# Patient Record
Sex: FEMALE | Race: WHITE
[De-identification: names, ages, dates, MRNs, and addresses within clinical notes are randomized per-mention and may not be internally consistent; named-entity substitution may affect disease eponyms.]

---

## 2018-09-03 ENCOUNTER — HOSPITAL ENCOUNTER (EMERGENCY)
Dept: HOSPITAL 25 - UCEAST | Age: 68
Discharge: HOME | End: 2018-09-03
Payer: MEDICARE

## 2018-09-03 VITALS — DIASTOLIC BLOOD PRESSURE: 78 MMHG | SYSTOLIC BLOOD PRESSURE: 140 MMHG

## 2018-09-03 DIAGNOSIS — Z88.2: ICD-10-CM

## 2018-09-03 DIAGNOSIS — H92.01: ICD-10-CM

## 2018-09-03 DIAGNOSIS — I10: ICD-10-CM

## 2018-09-03 DIAGNOSIS — Z88.1: ICD-10-CM

## 2018-09-03 DIAGNOSIS — H60.91: Primary | ICD-10-CM

## 2018-09-03 DIAGNOSIS — Z87.891: ICD-10-CM

## 2018-09-03 PROCEDURE — 99212 OFFICE O/P EST SF 10 MIN: CPT

## 2018-09-03 PROCEDURE — G0463 HOSPITAL OUTPT CLINIC VISIT: HCPCS

## 2018-09-03 NOTE — UC
Ear Complaint HPI





- HPI Summary


HPI Summary: 





67 y/o female presents to the urgent care c/o


c/o right ear pain for a few weeks, worse since last night.





- History of Current Complaint


Chief Complaint: UCEar


Stated Complaint: R EAR PAIN


Time Seen by Provider: 18 13:52


Hx Obtained From: Patient


Pain Intensity: 6





- Allergies/Home Medications


Allergies/Adverse Reactions: 


 Allergies











Allergy/AdvReac Type Severity Reaction Status Date / Time


 


azithromycin Allergy  Hives Verified 18 12:57


 


erythromycin base Allergy  GI Upset Verified 18 12:57


 


gatifloxacin [From Tequin] Allergy  Hives Verified 18 12:57


 


Sulfa (Sulfonamide Allergy  Hives Verified 18 12:57





Antibiotics)     














PMH/Surg Hx/FS Hx/Imm Hx





- Surgical History


Surgical History: Yes


Surgery Procedure, Year, and Place: tonsilLECTOMY.  tubal LIGATION.   

.  fatty tumor removed from UPPER abdomen BY RIB CAGE.  COLONOSCOPY





- Family History


Known Family History: Positive: Other - alcoholism/ subtance abuse





- Social History


Alcohol Use: None


Substance Use Type: None


Smoking Status (MU): Former Smoker


Type: Cigarettes


Amount Used/How Often: A FEW-2 PPD PPD X 24 YEARS


When Did the Patient Quit Smoking/Using Tobacco: 30 YEARS AGO





Physical Exam


Vital Signs: 


 Initial Vital Signs











Temp  98.8 F   18 12:50


 


Pulse  71   18 12:50


 


Resp  18   18 12:50


 


BP  140/78   18 12:50


 


Pulse Ox  95   18 12:50














Ear Complaint Course/Dx





- Differential Dx/Diagnosis


Differential Diagnosis/HQI/PQRI: Cerumen Impaction, Otitis Externa, Otitis Media

, Perforated TM, URI


Provider Diagnoses: 1- Rt otitis externa.  2- Otalgia.  3- Uncontrolled HTN





Discharge





- Discharge Plan


Condition: Stable


Disposition: HOME


Prescriptions: 


Neomyc/Polym/HC 1% OTIC SUSP* [Cortisporin Otic Susp 1%*] 4 drop RIGHT EAR QID #

1 btl


Patient Education Materials:  Otitis Externa (DC), Low-Sodium Diet (ED)


Referrals: 


Amilcar Pillai MD [Primary Care Provider] - 3 Days


Additional Instructions: 


1-Please apply otic antibiotic on your Rt ear as directed. 


2-continue taking Hydrocodone or Tylenol PO q6-8hrs  for pain. 


3-If symptoms do not improve or worsen please f/u with your PCP in 3 days  or 

return to the urgent care for further evaluation and treatment.


4-Your BP is elevated today. please decrease salt in your diet, monitor BP and 

if it continues to be elevated please f/u with your PCP for further management

















- Billing Disposition and Condition


Condition: STABLE


Disposition: Home

## 2018-09-03 NOTE — XMS REPORT
Stephanie Lindsey

 Created on:2018



Patient:Stephanie Lindsey

Sex:Female

:1950

External Reference #:[id number]





Demographics







 Address  [address] [id number]

 

 Home Phone  [phone]

 

 Email Address  [email or web address]

 

 Preferred Language  English

 

 Marital Status  Not  Or 

 

 Quaker Affiliation  Unknown

 

 Race  White

 

 Ethnic Group  Not  Or 









Author







 Organization  [employer]

 

 Address  [address]



   [place] [address]

 

 Phone  [phone]









Support







 Name  Relationship  Address  Phone

 

 Da Lindsey  Unavailable  Unavailable  [phone]









Care Team Providers







 Name  Role  Phone

 

 Luz Maria Trinh MD  Care Team Information   Unavailable

 

 Amilcar Pillai MD  Primary Care Physician  Unavailable









Payers







 Type  Date  Identification Numbers  Payment Provider  Subscriber

 

 Medicare Primary    Policy Number: [id number]  Medicare  Stephanie Lindsey









 PayID: [id number]  [address]









 [place] [address]









 [facility]    Policy Number: [id number]  [facility]  Stephanie Lindsey









 PayID: [id number]  [address]









 [place] [address]







Problems







 Date  Description  Provider  Status

 

 Onset: 2015  Trigeminal neuralgia  Felicia Tadeo M.D.  Active









   Note: right:  Open surgery Dr. Singh 4/13/15







Family History







 Date  Family Member(s)  Problem(s)  Comments

 

   General  Cancer  

 

   General  Sons with raynaud's  

 

   General  Brain Cancer  

 

   General  Cervical Cancer  

 

   Father  Esophagus Cancer  







Social History







 Type  Date  Description  Comments

 

 ETOH Use    Denies alcohol use  

 

 Smoking    Patient is a former smoker  

 

 Exercise Type/Frequency    Exercises regularly  







Allergies, Adverse Reactions, Alerts







 Date  Description  Reaction  Status  Severity  Comments

 

 10/31/2012  Sulfa  hives, disorientation  active    

 

 10/31/2012  Erythromycin  GI disturbance  active    

 

 10/31/2012  Ibuprofen    active  Severe  Digestive disturbance

 

 10/31/2012  Zithromax  rash  active    

 

 2015  TeQuin    active    







Medications







 Medication  Date  Status  Form  Strength  Qnty  SIG  Indications  Ordering



                 Provider

 

 Citalopram  /  Active  Tablets  40mg  30tabs  1 po qd    Unknown



 Hydrobromide  0000              

 

 Metoclopramide  /  Active  Tablets  5mg  120tab  2 tabs po    Unknown



 HCL  0000        s  q evening    

 

 Multivitamins  00/  Active  Capsules    30caps  1 capsule    Unknown



   0000          macho;y    

 

 Vitamin D 3  /  Active    1,000mg    1 po daily    Unknown



   0000              

 

 Omega 3  /  Active        1 po daily    Unknown



   0000              

 

 Atorvastatin  /  Active  Tablets  20mg    take 1    Unknown



 Calcium  0000          tablet at    



             bedtime    

 

 Melatonin  /  Active  Capsules  3mg    1 tab by    Unknown



   0000          mouth    



             every    



             night at    



             bedtime    

 

 Aspirin  /  Active  Tablets  325mg    1 by mouth    Unknown



   0000          every day    

 

 Hydrocodone-Acet  /  Active  Tablets  5-325mg    1-2 tabs    Unknown



 aminophen  0000          by mouth    



             every 4- 6    



             hours as    



             needed    



             pain    

 

 Nadolol  /  Active  Tablets  20mg    Take 1    Unknown



   0000          Tablet By    



             Mouth    



             Every Day    

 

 Citalopram  /  Active  Tablets  40mg    Take 1    Unknown



 Hydrobromide  0000          Tablet By    



             Mouth    



             Every Day    

 

 Famotidine  /  Active  Tablets  40mg    1 by mouth    Unknown



   0000          every day    

 

                 

 

 Prednisone  /  Hx  Tablets  20mg  25tabs  60mg PO    Mounika



    -          qam x 3    Gnadt, NP



   /          days, then    



             40mg PO    



             qam x 3    



             days, then    



             20mg PO    



             qam x 3    



             days, then    



             10mg PO    



             qam x 3    



             days, then    



             stop.    

 

 Tegretol-XR  /  Hx  Tablets ER  200mg  120tab  take 2 by    Felicia



    -    HR    s  mouth    Shwetha,



   /          twice a    M.D.



             day    

 

 Tegretol-XR  06/10/  Hx  Tablets ER  100mg  240tab  take 4 po    Felicia



    -    HR    s  bid    Shwetha,



   /              M.D.



                 

 

 Topiramate  /  Hx  Tablets  100mg  60tabs  1 by mouth    Mounika



   0000 -          twice a    Gnadt, NP



             day    



                 

 

 Topiramate  /  Hx  Tablets  25mg  60tabs  tab by    Felicia



   0000 -          mouth    Shwetha,



   /          twice a    M.D.



             day    

 

 Mysoline  /  Hx  Tablets  50mg  540tab  1/2 to 1    Felicia



   0000 -        s  tab qpm    Shwetha,



   /              M.D.



                 

 

 Metoprolol  /  Hx  Tablets  50mg  60tabs  1 po qd    Unknown



 Tartrate  0000 -              



   2017              

 

 Simvastatin  00/00/  Hx  Tablets  40mg  90tabs  1 po qhs    Unknown



   0000 -              



   2015              

 

 Hydrocodone/Acet  /00/  Hx  Tablets  5-325mg  30tabs  1 to 2    Unknown



 aminophen   -          tabs po    



   /          qhs prn    



                 

 

 Benadryl Allergy  /00/  Hx  Capsules  25mg  30caps  1 po qhs    Unknown



    -              



   2015              

 

 Nexium  /00/  Hx  Capsules  40mg  30caps  1 po qd    Unknown



   0000 -    DR          



   2017              

 

 Co Q10  00/  Hx  Capsules  200mg  30caps  1 po qd    Unknown



    -              



   2014              

 

 Aspirin  /00/  Hx  Tablets  325mg    1 po qd    Unknown



    -              



   2015              

 

 Topiramate  00/  Hx  Tablets  25mg  60tabs  1 by mouth    Mounika



   0000 -          twice a    Gnadt, NP



   05/16/          day in    



             addition    



             to 100mg    



             tabs    

 

 Keflex  /00/  Hx  Capsules      2 days    Unknown



   0000 -          left    



   2017              

 

 Propranolol HCL  00/  Hx  Tablets  20mg    1 by mouth    Unknown



   0000 -          twice a    



   08/17/          day    



   2018              

 

 Omeprazole  00/  Hx  Capsules  20mg    1 by mouth    Unknown



   0000 -    DR      every day    



   2018              







Vital Signs







 Date  Vital  Result  Comment

 

 2018  Height  62 inches  5'2"









 Weight  188.50 lb  

 

 Heart Rate  57 /min  

 

 BP Systolic Sitting  110 mmHg  

 

 BP Diastolic Sitting  68 mmHg  

 

 Pain Level  8  

 

 O2 % BldC Oximetry  96 %  

 

 BMI (Body Mass Index)  34.5 kg/m2  









 2017  Height  62 inches  5'2"









 Weight  197.00 lb  

 

 Heart Rate  80 /min  

 

 BP Systolic Sitting  110 mmHg  

 

 BP Diastolic Sitting  70 mmHg  

 

 Respiratory Rate  14 /min  

 

 Pain Level  7  

 

 BMI (Body Mass Index)  36.0 kg/m2  









 2017  Height  62 inches  5'2"









 Weight  201.00 lb  

 

 Heart Rate  78 /min  

 

 BP Systolic Sitting  110 mmHg  

 

 BP Diastolic Sitting  64 mmHg  

 

 Respiratory Rate  14 /min  

 

 Pain Level  6  

 

 BMI (Body Mass Index)  36.8 kg/m2  









 2015  Height  62 inches  5'2"









 Weight  175.00 lb  

 

 Heart Rate  72 /min  

 

 BP Systolic Sitting  114 mmHg  

 

 BP Diastolic Sitting  70 mmHg  

 

 Respiratory Rate  16 /min  

 

 BMI (Body Mass Index)  32.0 kg/m2  









 10/20/2014  Height  62 inches  5'2"









 Weight  191.50 lb  

 

 Heart Rate  72 /min  

 

 BP Systolic Sitting  110 mmHg  

 

 BP Diastolic Sitting  76 mmHg  

 

 Respiratory Rate  16 /min  

 

 BMI (Body Mass Index)  35.0 kg/m2  









 2014  Height  62 inches  5'2"









 Weight  192.12 lb  

 

 Heart Rate  70 /min  

 

 BP Systolic Sitting  102 mmHg  

 

 BP Diastolic Sitting  70 mmHg  

 

 Respiratory Rate  16 /min  

 

 BMI (Body Mass Index)  35.1 kg/m2  









 2014  Heart Rate  60 /min  









 BP Systolic Sitting  106 mmHg  

 

 BP Diastolic Sitting  70 mmHg  

 

 Respiratory Rate  16 /min  









 2013  Heart Rate  60 /min  









 BP Systolic Sitting  114 mmHg  

 

 BP Diastolic Sitting  68 mmHg  

 

 Respiratory Rate  12 /min  









 10/31/2012  Weight  185.00 lb  









 Heart Rate  72 /min  

 

 BP Systolic  130 mmHg  

 

 BP Diastolic  78 mmHg  

 

 Respiratory Rate  14 /min  







Results







 Test  Date  Test  Result  H/L  Range  Note

 

 Laboratory test finding  2017  Uric Acid  6.5 mg/dL    2.3-6.6  1

 

 Anca AB Ser If  2017  C-Anca  Negative    Negative  









 P-Anca  Negative    Negative  2









 Laboratory test finding  2017  Creatine Kinase(CK)  51 U/L      3

 

 Laboratory test finding  2017  Erythrocyte Sed Rate  13 mm/Hr    0-40  4









 C Reactive Protein  5.57 mg/L  High  < 5.00  5









 Connective Tissue Panel  2017  Anti-Nuclear Antibody  0.3 U      6









 Cyclic Citrullinated Peptide  <15.6 U      7

 

 Interpretation  See Comment      8









 Laboratory test finding  2017  Rheumatoid Factor  <15 IU/mL    <15  9

 

 Hla B27  2017  Hla B27  Negative      10









 Hla B27 Interp  See Comment      11









 Laboratory test finding  2017  TSH (Thyroid Stim  1.38 mcIU/mL    0.34-
5.60  12



     Horm)        

 

 Hepatitis Acute Panel  2017  Hepatitis B Surface  Nonreactive    
Nonreactive  



     Antigen        









 Hepatitis B Core IgM  Nonreactive    Nonreactive  

 

 Hepatitis A AB IgM  Nonreactive    Nonreactive  

 

 Hepatitis C Antibody  Nonreactive    Nonreactive  









 Laboratory test finding  2017  Vitamin D, 1,25 Dihydroxy  67 pg/mL    18-
78  13









 Angiotensin Converting Enzyme  22 U/L    8 - 53  14









 CBC Auto Diff  2017  White Blood Count  5.6 10^3/uL    3.5-10.8  









 Red Blood Count  4.68 10^6/uL    4.0-5.4  

 

 Hemoglobin  14.0 g/dL    12.0-16.0  

 

 Hematocrit  42 %    35-47  

 

 Mean Corpuscular Volume  90 fL    80-97  

 

 Mean Corpuscular Hemoglobin  30 pg    27-31  

 

 Mean Corpuscular HGB Conc  33 g/dL    31-36  

 

 Red Cell Distribution Width  14 %    10.5-15  

 

 Platelet Count  216 10^3/uL    150-450  

 

 Mean Platelet Volume  9 um3    7.4-10.4  

 

 Abs Neutrophils  2.7 10^3/uL    1.5-7.7  

 

 Abs Lymphocytes  1.9 10^3/uL    1.0-4.8  

 

 Abs Monocytes  0.7 10^3/uL    0-0.8  

 

 Abs Eosinophils  0.3 10^3/uL    0-0.6  

 

 Abs Basophils  0 10^3/uL    0-0.2  

 

 Abs Nucleated RBC  0 10^3/uL      

 

 Granulocyte %  48.8 %    38-83  

 

 Lymphocyte %  34.3 %    25-47  

 

 Monocyte %  11.7 %  High  1-9  

 

 Eosinophil %  4.5 %    0-6  

 

 Basophil %  0.7 %    0-2  

 

 Nucleated Red Blood Cells %  0.1      









 Comp Metabolic Panel  2017  Sodium  137 mmol/L    133-145  









 Potassium  4.5 mmol/L    3.5-5.0  

 

 Chloride  106 mmol/L    101-111  

 

 Co2 Carbon Dioxide  28 mmol/L    22-32  

 

 Anion Gap  3 mmol/L    2-11  

 

 Glucose  124 mg/dL  High    

 

 Blood Urea Nitrogen  13 mg/dL    6-24  

 

 Creatinine  0.75 mg/dL    0.51-0.95  

 

 BUN/Creatinine Ratio  17.3    8-20  

 

 Calcium  9.1 mg/dL    8.6-10.3  

 

 Total Protein  6.1 g/dL  Low  6.4-8.9  

 

 Albumin  3.6 g/dL    3.2-5.2  

 

 Globulin  2.5 g/dL    2-4  

 

 Albumin/Globulin Ratio  1.4    1-3  

 

 Total Bilirubin  0.40 mg/dL    0.2-1.0  

 

 Alkaline Phosphatase  50 U/L      

 

 Alt  33 U/L    7-52  

 

 Ast  32 U/L    13-39  

 

 Egfr Non-  77.1    >60  

 

 Egfr   99.1    >60  15









 Laboratory test finding  10/15/2014  Carbamazepine  9.5 g/mL    4.0-12.0  

 

 Comp Metabolic Panel  10/15/2014  Sodium  138 mmol/L    133-145  









 Potassium  4.0 mmol/L    3.7-5.6  

 

 Chloride  108 mmol/L    101-111  

 

 Co2 Carbon Dioxide  25 mmol/L    22-32  

 

 Anion Gap  5 mmol/L    2-11  

 

 Glucose  101 mg/dL  High    

 

 Blood Urea Nitrogen  14 mg/dL    6-24  

 

 Creatinine  0.77 mg/dL    0.51-0.95  

 

 BUN/Creatinine Ratio  18.2    8-20  

 

 Calcium  8.9 mg/dL    8.6-10.3  

 

 Total Protein  6.4 g/dL    6.4-8.9  

 

 Albumin  4.0 g/dL    3.2-5.2  

 

 Globulin  2.4 g/dL    2-4  

 

 Albumin/Globulin Ratio  1.7    1-3  

 

 Total Bilirubin  0.30 mg/dL    0.2-1.0  

 

 Alkaline Phosphatase  56 U/L      

 

 Alt  16 U/L    7-52  

 

 Ast  16 U/L    13-39  

 

 Egfr Non-  75.5    >60  

 

 Egfr   97.1    >60  16









 CBC Auto Diff  2014  White Blood Count  6.0 10^3/uL    4.8-10.8  17









 Red Blood Count  4.49 10^6/uL    4.0-5.4  17

 

 Hemoglobin  14.5 g/dL    12.0-16.0  17

 

 Hematocrit  42 %    35-47  17

 

 Mean Corpuscular Volume  94 fL    80-97  17

 

 Mean Corpuscular Hemoglobin  32 pg  High  27-31  17

 

 Mean Corpuscular HGB Conc  34 g/dL    31-36  17

 

 Red Cell Distribution Width  13 %    10.5-15  17

 

 Platelet Count  258 10^3/uL    150-450  17

 

 Mean Platelet Volume  8 um3    7.4-10.4  17

 

 Abs Neutrophils  3.5 10^3/uL    1.5-7.7  17

 

 Abs Lymphocytes  1.8 10^3/uL    1.0-4.8  17

 

 Abs Monocytes  0.4 10^3/uL    0-0.8  17

 

 Abs Eosinophils  0.3 10^3/uL    0-0.6  17

 

 Abs Basophils  0 10^3/uL    0-0.2  17

 

 Abs Nucleated RBC  0 10^3/uL      17

 

 Granulocyte %  58.6 %    38-83  17

 

 Lymphocyte %  29.2 %    25-47  17

 

 Monocyte %  6.3 %    1-9  17

 

 Eosinophil %  5.4 %    0-6  17

 

 Basophil %  0.5 %    0-2  17

 

 Nucleated Red Blood Cells %  0      17









 CBC Auto Diff  2014  White Blood Count  5.8 10^3/uL    4.8-10.8  









 Red Blood Count  4.58 10^6/uL    4.0-5.4  

 

 Hemoglobin  14.4 g/dL    12.0-16.0  

 

 Hematocrit  42 %    35-47  

 

 Mean Corpuscular Volume  92 fL    80-97  

 

 Mean Corpuscular Hemoglobin  31 pg    27-31  

 

 Mean Corpuscular HGB Conc  34 g/dL    31-36  

 

 Red Cell Distribution Width  13 %    10.5-15  

 

 Platelet Count  224 10^3/uL    150-450  

 

 Mean Platelet Volume  8 um3    7.4-10.4  

 

 Abs Neutrophils  3.4 10^3/uL    1.5-7.7  

 

 Abs Lymphocytes  1.6 10^3/uL    1.0-4.8  

 

 Abs Monocytes  0.5 10^3/uL    0-0.8  

 

 Abs Eosinophils  0.3 10^3/uL    0-0.6  

 

 Abs Basophils  0 10^3/uL    0-0.2  

 

 Abs Nucleated RBC  0 10^3/uL      

 

 Granulocyte %  57.9 %    38-83  

 

 Lymphocyte %  28.1 %    25-47  

 

 Monocyte %  8.1 %    1-9  

 

 Eosinophil %  5.3 %    0-6  

 

 Basophil %  0.6 %    0-2  

 

 Nucleated Red Blood Cells %  0.1      









 Laboratory test finding  2014  Carbamazepine  9.3 g/mL    4.0-12.0  

 

 Comp Metabolic Panel  2014  Sodium  137 mmol/L    133-145  









 Potassium  4.3 mmol/L    3.7-5.6  

 

 Chloride  107 mmol/L    101-111  

 

 Co2 Carbon Dioxide  24 mmol/L    22-32  

 

 Anion Gap  6 mmol/L    2-11  

 

 Glucose  100 mg/dL      

 

 Blood Urea Nitrogen  11 mg/dL    6-24  

 

 Creatinine  0.68 mg/dL    0.51-0.95  

 

 BUN/Creatinine Ratio  16.2    8-20  

 

 Calcium  9.0 mg/dL    8.6-10.3  

 

 Total Protein  6.3 g/dL  Low  6.4-8.9  

 

 Albumin  4.1 g/dL    3.2-5.2  

 

 Globulin  2.2 g/dL    2-4  

 

 Albumin/Globulin Ratio  1.9    1-3  

 

 Total Bilirubin  0.30 mg/dL    0.2-1.0  

 

 Alkaline Phosphatase  59 U/L      

 

 Alt  16 U/L    7-52  

 

 Ast  16 U/L    13-39  

 

 Egfr Non-  87.1    >60  

 

 Egfr   112.0    >60  18









 Creatinine  2013  Creatinine  0.80 mg/dL    0.50-1.40  









 Egfr Non-  72.4    >60  

 

 Egfr   93.2    >60  19









 CBC Auto Diff  2013  White Blood Count  6.5 10^3/uL    4.8-10.8  









 Red Blood Count  4.42 10^6/uL    4.0-5.4  

 

 Hemoglobin  13.8 g/dL    12.0-16.0  

 

 Hematocrit  42 %    35-47  

 

 Mean Corpuscular Volume  95 fL    80-97  

 

 Mean Corpuscular Hemoglobin  31 pg    27-31  

 

 Mean Corpuscular HGB Conc  33 g/dL    31-36  

 

 Red Cell Distribution Width  13 %    10.5-15  

 

 Platelet Count  235 10^3/uL    150-450  

 

 Mean Platelet Volume  8 um3    7.4-10.4  

 

 Abs Neutrophils  4.0 10^3/uL    1.5-7.7  

 

 Abs Lymphocytes  1.6 10^3/uL    1.0-4.8  

 

 Abs Monocytes  0.5 10^3/uL    0-0.8  

 

 Abs Eosinophils  0.4 10^3/uL    0-0.6  

 

 Abs Basophils  0 10^3/uL    0-0.2  

 

 Abs Nucleated RBC  0 10^3/uL      

 

 Granulocyte %  61.3 %    38-83  

 

 Lymphocyte %  24.7 %  Low  25-47  

 

 Monocyte %  7.8 %    1-9  

 

 Eosinophil %  5.8 %    0-6  

 

 Basophil %  0.4 %    0-2  

 

 Nucleated Red Blood Cells %  0      









 Liver Function Panel  2013  Total Protein  6.5 g/dL    6.2-8.1  









 Albumin  3.6 g/dL    3.2-5.2  

 

 Globulin  2.9 g/dL    2-4  

 

 Albumin/Globulin Ratio  1.2    1-3  

 

 Total Bilirubin  0.6 mg/dL    0.4-1.5  

 

 Direct Bilirubin  0 mg/dL  Low  0.1-0.5  

 

 Indirect Bilirubin  (SEE NOTE) mg/dL    0.3-1.0  20

 

 Alkaline Phosphatase  63 U/L      

 

 Alt  20 U/L    14-54  

 

 Ast  21 U/L    12-42  









 Laboratory test finding  2013  Carbamazepine  8.1 g/mL    4.0-12.0  









 1  Please check labs today

 

 2  Negative for cANCA and pANCA patterns by immunofluorescence.



   -------------------ADDITIONAL INFORMATION-------------------



   This test was developed and its performance characteristics



   determined by [facility] in a manner consistent with CLIA



   requirements. This test has not been cleared or approved by



   the U.S. Food and Drug Administration.



   Test Performed by:



   [facility] - [address] [address]

 

 3  Please check labs today

 

 4  Please check labs today

 

 5  Acute inflammation:  >10.00

 

 6  -------------------REFERENCE VALUE--------------------------



   <=1.0 (Negative)

 

 7  -------------------REFERENCE VALUE--------------------------



   <20.0 (Negative)

 

 8  Tests for antibodies to dsDNA and KYLEE antigens are not



   performed automatically unless the MINNIE result is > or=



   3.0 U.  Studies performed at [facility] indicate that



   positive MINNIE results <3.0 U are rarely accompanied by



   positive second order tests.



   Test Performed by:



   [facility] [place] [address]

 

 9  Test Performed by:



   [facility] - [address] [address]

 

 10  -------------------REFERENCE VALUE--------------------------



   Not Applicable

 

 11  RESULT: HLA-B27 antigen was not detected.



   -------------------ADDITIONAL INFORMATION-------------------



   Method: Flow Cytometry



   Performing Laboratory CLIA# [id number]



   Test Performed by:



   [facility] - [address] [id number]

 

 12  Please check labs today

 

 13  -------------------ADDITIONAL INFORMATION-------------------



   This test was developed and its performance characteristics



   determined by [facility] in a manner consistent with CLIA



   requirements. This test has not been cleared or approved by



   the U.S. Food and Drug Administration.



   Test Performed by:



   [facility] - [facility]



   [address] [place] [address]

 

 14  Test Performed by:



   [facility] - [facility]



   [address] [place] [address]

 

 15  *******Because ethnic data is not always readily available,



   this report includes an eGFR for both -Americans and



   non- Americans.****



   The National Kidney Disease Education Program (NKDEP) does



   not endorse the use of the MDRD equation for patients that



   are not between the ages of 18 and 70, are pregnant, have



   extremes of body size, muscle mass, or nutritional status,



   or are non- or non-.



   According to the National Kidney Foundation, irrespective of



   diagnosis, the stage of the disease is based on the level of



   kidney function:



   Stage Description                      GFR(mL/min/1.73 m(2))



   1     Kidney damage with normal or decreased GFR       90



   2     Kidney damage with mild decrease in GFR          60-89



   3     Moderate decrease in GFR                         30-59



   4     Severe decrease in GFR                           15-29



   5     Kidney failure                       <15 (or dialysis)

 

 16  *******Because ethnic data is not always readily available,



   this report includes an eGFR for both -Americans and



   non- Americans.****



   The National Kidney Disease Education Program (NKDEP) does



   not endorse the use of the MDRD equation for patients that



   are not between the ages of 18 and 70, are pregnant, have



   extremes of body size, muscle mass, or nutritional status,



   or are non- or non-.



   According to the National Kidney Foundation, irrespective of



   diagnosis, the stage of the disease is based on the level of



   kidney function:



   Stage Description                      GFR(mL/min/1.73 m(2))



   1     Kidney damage with normal or decreased GFR       90



   2     Kidney damage with mild decrease in GFR          60-89



   3     Moderate decrease in GFR                         30-59



   4     Severe decrease in GFR                           15-29



   5     Kidney failure                       <15 (or dialysis)

 

 17  Draw in am prior to first dose tegretol

 

 18  *******Because ethnic data is not always readily available,



   this report includes an eGFR for both -Americans and



   non- Americans.****



   The National Kidney Disease Education Program (NKDEP) does



   not endorse the use of the MDRD equation for patients that



   are not between the ages of 18 and 70, are pregnant, have



   extremes of body size, muscle mass, or nutritional status,



   or are non- or non-.



   According to the National Kidney Foundation, irrespective of



   diagnosis, the stage of the disease is based on the level of



   kidney function:



   Stage Description                      GFR(mL/min/1.73 m(2))



   1     Kidney damage with normal or decreased GFR       90



   2     Kidney damage with mild decrease in GFR          60-89



   3     Moderate decrease in GFR                         30-59



   4     Severe decrease in GFR                           15-29



   5     Kidney failure                       <15 (or dialysis)

 

 19  *******Because ethnic data is not always readily available,



   this report includes an eGFR for both -Americans and



   non- Americans.****



   The National Kidney Disease Education Program (NKDEP) does



   not endorse the use of the MDRD equation for patients that



   are not between the ages of 18 and 70, are pregnant, have



   extremes of body size, muscle mass, or nutritional status,



   or are non- or non-.



   According to the National Kidney Foundation, irrespective of



   diagnosis, the stage of the disease is based on the level of



   kidney function:



   Stage Description                      GFR(mL/min/1.73 m(2))



   1     Kidney damage with normal or decreased GFR       90



   2     Kidney damage with mild decrease in GFR          60-89



   3     Moderate decrease in GFR                         30-59



   4     Severe decrease in GFR                           15-29



   5     Kidney failure                       <15 (or dialysis)

 

 20  Unable to calculate Ind Bili as D Bili is <0.1







Procedures







 Date  CPT Code  Description  Status

 

 04/15/2013  [address]  Rad Exam; Hand Comp  Completed







Encounters







 Type  Date  Location  Provider  CPT E/M  Dx

 

 Office Visit  2018  [facility]  Félix Catalan M.D.  [id number]  
L40.50



   9:20a  Of [facility]      









 Z79.899

 

 L40.9

 

 M54.5

 

 E55.9









 Office Visit  2017  3:40p  [facility]  Félix Catalan  
[address]  M54.5



     Lexa CHURCH    









 M54.6

 

 M54.2

 

 L40.9









 Office Visit  2017 10:00a  [facility]  Félix Catalan  
[address]  M06.4



     Lexa CHURCH    









 M79.1

 

 E55.9

 

 Z79.899

 

 R53.83

 

 M54.5

 

 M54.6

 

 M54.2

 

 L40.9









 Office Visit  2015 11:00a  [facility]  Felicia Tadeo M.D.  [address]
  350.1



     [facility]      









 333.1

 

 V58.69

 

 784.0









 Office Visit  10/20/2014 10:00a  [facility]  Felicia Tadeo M.D.  [address]
  350.1



     [facility]      









 333.1









 Office Visit  2014 11:45a  [facility]  Felicia Tadeo M.D.  [address]
  350.1



     [facility]      









 333.1









 Office Visit  2014 11:00a  [facility]  Felicia Tadeo M.D.  [address]
  350.1



     [facility]      









 333.1









 Office Visit  2013  1:00p  [facility]  Felicia Tadeo M.D.  [id number]
  350.1



     [facility]      









 333.1









 Office Visit  2013  8:00a  [facility]  Felicia Tadeo  [id number]  
350.1



     [facility]  MTOBIAS    

 

 Office Visit  04/15/2013 11:00a  [facility]  [place]  [id number]  
[phone]



     NURIS Case M.D.    

 

 Office Visit  2013  9:44a  [facility],  [place],  
[id number]  [phone]



     Hospitalists  M.D.    









 272.2

 

 272.1

 

 350.1









 Office Visit  10/31/2012 11:30a  [facility]  Felicia Tadeo M.D.  [id number]
  350.1



     [facility]      









 333.1







Plan of Care

Future Appointment(s):2018  9:40 am - Félix Catalan M.D. at [facility] 
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 
[id number] Other long term (current) drug therapyNew Xrays:Chest PA &amp
; Lat 2 VWSL40.9 Psoriasis, unspecifiedFollow up:Follow up in 5 weeks or sooner 
if tixatkM04.5 Low back painNew Xrays:SP Lumbarsacral 4+ VWSE55.9 Vitamin D 
deficiency, unspecified

## 2018-09-04 NOTE — UC
Discharge





- Sign-Out/Discharge


Documenting (check all that apply): Post-Discharge Follow Up


All imaging exams completed and their final reports reviewed: No Studies





- Discharge Plan


Condition: Stable


Disposition: HOME


Prescriptions: 


Neomyc/Polym/HC 1% OTIC SUSP* [Cortisporin Otic Susp 1%*] 4 drop RIGHT EAR QID #

1 btl


Patient Education Materials:  Otitis Externa (DC), Low-Sodium Diet (ED)


Referrals: 


Amilcar Pillai MD [Primary Care Provider] - 3 Days


Additional Instructions: 


1-Please apply otic antibiotic on your Rt ear as directed. 


2-continue taking Hydrocodone or Tylenol PO q6-8hrs  for pain. 


3-If symptoms do not improve or worsen please f/u with your PCP in 3 days  or 

return to the urgent care for further evaluation and treatment.


4-Your BP is elevated today. please decrease salt in your diet, monitor BP and 

if it continues to be elevated please f/u with your PCP for further management

















- Billing Disposition and Condition


Condition: STABLE


Disposition: Home

## 2018-11-12 ENCOUNTER — HOSPITAL ENCOUNTER (OUTPATIENT)
Dept: HOSPITAL 25 - OR | Age: 68
Discharge: HOME | End: 2018-11-12
Attending: SURGERY
Payer: MEDICARE

## 2018-11-12 VITALS — DIASTOLIC BLOOD PRESSURE: 68 MMHG | SYSTOLIC BLOOD PRESSURE: 111 MMHG

## 2018-11-12 DIAGNOSIS — L40.50: ICD-10-CM

## 2018-11-12 DIAGNOSIS — K21.9: ICD-10-CM

## 2018-11-12 DIAGNOSIS — F41.9: ICD-10-CM

## 2018-11-12 DIAGNOSIS — D17.1: Primary | ICD-10-CM

## 2018-11-12 PROCEDURE — 88304 TISSUE EXAM BY PATHOLOGIST: CPT

## 2018-11-12 NOTE — BRIEFOPN
Brief Operative Note





- Surgery


Procedures: 


Procedures





OPERATIVE REPORT





PRE-OP: Lipoma right mons pubis





POST-OP:4 cm lipoma right mons pubis





PROCEDURE:Excision of lipoma right mons pubis





SURGEON: MD Delphine


ANESTHESIA:Local with MAC         Dr. Vasquez


ASST:none


IVF:min


EBL:min


SPECIMEN:Lipoma


DRAIN: none


WOUND CLASS:One


COMPLICATIONS: none


TO PACU

## 2018-11-13 NOTE — OP
DATE OF OPERATION:  11/12/18 - [facility]

 

DATE OF BIRTH:  [birth year]

 

SURGEON:  Adam Akers MD.

 

ASSISTANT:  None.

 

ANESTHESIOLOGIST:  Dr. Vasquez.

 

ANESTHESIA:  Local with monitored anesthesia care.

 

PRE-OP DIAGNOSIS:  Subcutaneous mass right mons pubis.

 

POST-OP DIAGNOSIS:  Lipoma, right mons pubis.

 

OPERATIVE PROCEDURE:  Excision of lipoma in the right side of the mons pubis.

 

ESTIMATED BLOOD LOSS:  Minimal.

 

SPECIMEN:  Apparent lipoma.

 

WOUND CLASSIFICATION:  1.

 

DRAINS:  None.

 

COMPLICATIONS:  Nil.

 

DESCRIPTION OF PROCEDURE:  Written informed consent was obtained, and the site 
was marked with indelible ink.  The patient was taken to the operating room and 
placed in a supine position.  Sequential compression devices and a warming 
blanket were applied.  Anesthesia was administered and the right groin and 
upper portion of the external genitalia was prepped and draped in usual sterile 
fashion.

 

A time-out verification was completed.

 

A 0.25% Marcaine mixed with 1% lidocaine was then infiltrated and an oblique 
incision was made over the palpable subcutaneous mass.

 

I then proceeded to excise what appeared to be approximately a 4 cm lipoma from 
the surrounding tissue and was completely removed.  Specimen was sent for 
pathology.

 

Hemostasis was assured.  The wound was closed with several interrupted 3-0 
Vicryl subcutaneous sutures. The skin was approximated with subcuticular 4-0 
Vicryl suture.  Steri-Strips and a sterile dressing were applied.  The patient 
tolerated the procedure well and was taken to the recovery room in stable 
condition.

 

 [id number]/CPS #: [id number]

[facility]